# Patient Record
Sex: MALE | Race: WHITE | NOT HISPANIC OR LATINO | Employment: FULL TIME | URBAN - METROPOLITAN AREA
[De-identification: names, ages, dates, MRNs, and addresses within clinical notes are randomized per-mention and may not be internally consistent; named-entity substitution may affect disease eponyms.]

---

## 2019-01-03 ENCOUNTER — OFFICE VISIT - HEALTHEAST (OUTPATIENT)
Dept: FAMILY MEDICINE | Facility: CLINIC | Age: 54
End: 2019-01-03

## 2019-01-03 DIAGNOSIS — Z01.818 PREOP GENERAL PHYSICAL EXAM: ICD-10-CM

## 2019-01-03 DIAGNOSIS — G89.29 CHRONIC LEFT SHOULDER PAIN: ICD-10-CM

## 2019-01-03 DIAGNOSIS — M25.512 CHRONIC LEFT SHOULDER PAIN: ICD-10-CM

## 2019-01-03 LAB — HGB BLD-MCNC: 15.6 G/DL (ref 14–18)

## 2019-01-03 ASSESSMENT — MIFFLIN-ST. JEOR: SCORE: 1707.49

## 2019-04-10 ENCOUNTER — AMBULATORY - HEALTHEAST (OUTPATIENT)
Dept: FAMILY MEDICINE | Facility: CLINIC | Age: 54
End: 2019-04-10

## 2019-04-10 DIAGNOSIS — Z12.11 COLON CANCER SCREENING: ICD-10-CM

## 2019-05-21 ENCOUNTER — RECORDS - HEALTHEAST (OUTPATIENT)
Dept: ADMINISTRATIVE | Facility: OTHER | Age: 54
End: 2019-05-21

## 2019-05-22 ENCOUNTER — RECORDS - HEALTHEAST (OUTPATIENT)
Dept: ADMINISTRATIVE | Facility: OTHER | Age: 54
End: 2019-05-22

## 2019-06-12 ENCOUNTER — OFFICE VISIT - HEALTHEAST (OUTPATIENT)
Dept: FAMILY MEDICINE | Facility: CLINIC | Age: 54
End: 2019-06-12

## 2019-06-12 ENCOUNTER — RECORDS - HEALTHEAST (OUTPATIENT)
Dept: ADMINISTRATIVE | Facility: OTHER | Age: 54
End: 2019-06-12

## 2019-06-12 DIAGNOSIS — Z01.818 PREOP GENERAL PHYSICAL EXAM: ICD-10-CM

## 2019-06-12 DIAGNOSIS — G89.29 CHRONIC LEFT SHOULDER PAIN: ICD-10-CM

## 2019-06-12 DIAGNOSIS — M25.512 CHRONIC LEFT SHOULDER PAIN: ICD-10-CM

## 2019-06-12 LAB — HGB BLD-MCNC: 15.1 G/DL (ref 14–18)

## 2019-06-12 ASSESSMENT — MIFFLIN-ST. JEOR: SCORE: 1700.36

## 2019-06-24 ENCOUNTER — RECORDS - HEALTHEAST (OUTPATIENT)
Dept: ADMINISTRATIVE | Facility: OTHER | Age: 54
End: 2019-06-24

## 2020-09-26 ENCOUNTER — RECORDS - HEALTHEAST (OUTPATIENT)
Dept: ADMINISTRATIVE | Facility: OTHER | Age: 55
End: 2020-09-26

## 2021-05-29 NOTE — PROGRESS NOTES
Preoperative Exam    Scheduled Procedure: left shoulder surgery  Surgery Date:  6/24/19   Surgery Location: [facility], fax [phone]    Surgeon:  Dr. Rosen    Assessment/Plan:     Preop general physical exam, Chronic left shoulder pain  -     Hemoglobin    Surgical Procedure Risk: Low (reported cardiac risk generally < 1%)  Have you had prior anesthesia?: Yes  Have you or any family members had a previous anesthesia reaction:  No  Do you or any family members have a history of a clotting or bleeding disorder?: No  Cardiac Risk Assessment: no increased risk for major cardiac complications    Patient approved for surgery with general or local anesthesia.    Functional Status: Independent  Patient plans to recover at home with family.     Subjective:      Francisco Magana is a 54 y.o. male who presents for a preoperative consultation.  Chronic left shoulder pain from years of wear and tear.  No specific injury.  He has been following up with orthopedic.  He has limitation in range of motion because of the pain.  He initially has surgery scheduled for January 7, 2019 due to insurance difficulty that date was postponed for June 24, 2019    All other systems reviewed and are negative, other than those listed in the HPI.    Pertinent History  Do you have difficulty breathing or chest pain after walking up a flight of stairs: No  History of obstructive sleep apnea: No  Steroid use in the last 6 months: No  Frequent Aspirin/NSAID use: No  Prior Blood Transfusion: No  Prior Blood Transfusion Reaction: No  If for some reason prior to, during or after the procedure, if it is medically indicated, would you be willing to have a blood transfusion?:  There is no transfusion refusal.    No current outpatient medications on file.     No current facility-administered medications for this visit.         Allergies   Allergen Reactions     Adhesive Tape-Silicones Hives       Patient Active  Problem List   Diagnosis     Anxiety     Mood Disorder Of Unknown ( Axis III ) Etiology      Status post right hip replacement     Colon polyp       Past Medical History:   Diagnosis Date     Arthritis        Past Surgical History:   Procedure Laterality Date     BACK SURGERY       TOTAL HIP ARTHROPLASTY Right 2/24/2015    Procedure: HIP TOTAL ARTHROPLASTY RIGHT;  Surgeon: Escobar Jarrell MD;  Location: [facility] Main OR;  Service:        Social History     Socioeconomic History     Marital status: Single     Spouse name: Not on file     Number of children: Not on file     Years of education: Not on file     Highest education level: Not on file   Occupational History     Not on file   Social Needs     Financial resource strain: Not on file     Food insecurity:     Worry: Not on file     Inability: Not on file     Transportation needs:     Medical: Not on file     Non-medical: Not on file   Tobacco Use     Smoking status: Never Smoker     Smokeless tobacco: Never Used   Substance and Sexual Activity     Alcohol use: Yes     Alcohol/week: 1.0 oz     Types: 2 Standard drinks or equivalent per week     Comment: Occasionally.     Drug use: Not on file     Sexual activity: Yes     Partners: Female   Lifestyle     Physical activity:     Days per week: Not on file     Minutes per session: Not on file     Stress: Not on file   Relationships     Social connections:     Talks on phone: Not on file     Gets together: Not on file     Attends [employer] service: Not on file     Active member of club or organization: Not on file     Attends meetings of clubs or organizations: Not on file     Relationship status: Not on file     Intimate partner violence:     Fear of current or ex partner: Not on file     Emotionally abused: Not on file     Physically abused: Not on file     Forced sexual activity: Not on file   Other Topics Concern     Not on file   Social History Narrative     Not on file       Patient Care Team:  Virginia Shrestha  "Sita Waller MD as PCP - General          Objective:     Vitals:    06/12/19 1253   BP: 100/64   Pulse: 63   SpO2: 96%   Weight: 187 lb (84.8 kg)   Height: 5' 11\" (1.803 m)         Physical Exam:    Objective:    Physical Exam   Vitals:    06/12/19 1253   BP: 100/64   Pulse: 63   SpO2: 96%      Constitutional: Patient is oriented to person, place, and time. Patient appears well-developed and well-nourished. No distress.   Head: Normocephalic and atraumatic.   Right Ear: External ear normal. Normal TM  Left Ear: External ear normal. Normal TM  Nose: Nose normal.   Mouth/Throat: Oropharynx is clear and moist. No oropharyngeal exudate.   Eyes: Conjunctivae and EOM are normal. Pupils are equal, round, and reactive to light. Right eye exhibits no discharge. Left eye exhibits no discharge. No scleral icterus.   Neck: Neck supple. No JVD present. No tracheal deviation present. No thyromegaly present.   Cardiovascular: Normal rate, regular rhythm, normal heart sounds and intact distal pulses. No murmur heard.   Pulmonary/Chest: Effort normal and breath sounds normal. No stridor. No respiratory distress. Patient has no wheezes, no rales, exhibits no tenderness.   Skin: Skin is warm and dry. No rash noted. Patient is not diaphoretic. No erythema. No pallor.    Results for orders placed or performed in visit on 06/12/19   Hemoglobin   Result Value Ref Range    Hemoglobin 15.1 14.0 - 18.0 g/dL        Immunization History   Administered Date(s) Administered     Influenza, seasonal,quad inj 36+ mos 12/18/2015, 10/18/2016     Influenza, seasonal,quad inj 6-35 mos 11/05/2014     Tdap 05/09/2007         Electronically signed by Sita Shrestha MD 06/12/19 12:50 PM  "

## 2021-06-02 VITALS — BODY MASS INDEX: 26.28 KG/M2 | WEIGHT: 187.7 LBS | HEIGHT: 71 IN

## 2021-06-03 VITALS — HEIGHT: 71 IN | BODY MASS INDEX: 26.18 KG/M2 | WEIGHT: 187 LBS

## 2021-06-22 NOTE — PROGRESS NOTES
Preoperative Exam    Scheduled Procedure:  Left Shoulder Ligament Repair   Surgery Date:  1/7/19  Surgery Location: [facility], fax [phone]    Surgeon:  Dr. Bhaskar Mendes     Assessment/Plan:     Preop general physical exam, Chronic left shoulder pain  -     Hemoglobin    Surgical Procedure Risk: Low (reported cardiac risk generally < 1%)  Have you had prior anesthesia?: Yes  Have you or any family members had a previous anesthesia reaction:  No  Do you or any family members have a history of a clotting or bleeding disorder?: No  Cardiac Risk Assessment: no increased risk for major cardiac complications    Patient approved for surgery with general or local anesthesia.    Functional Status: Independent  Patient plans to recover at home with family.     Subjective:      Francisco Magana is a 53 y.o. male who presents for a preoperative consultation.  Chronic left shoulder pain from years of wear and tear.  No specific injury.  He has been following up with orthopedic.  He has limitation in range of motion because of the pain.    All other systems reviewed and are negative, other than those listed in the HPI.    Pertinent History  Surgical history of bilateral hip replacement and back surgery  Do you have difficulty breathing or chest pain after walking up a flight of stairs: No  History of obstructive sleep apnea: No  Steroid use in the last 6 months: No  Frequent Aspirin/NSAID use: No  Prior Blood Transfusion: No  Prior Blood Transfusion Reaction: No  If for some reason prior to, during or after the procedure, if it is medically indicated, would you be willing to have a blood transfusion?:  There is no transfusion refusal.    No current outpatient medications on file.     No current facility-administered medications for this visit.         Allergies   Allergen Reactions     Adhesive Tape-Silicones Hives       Patient Active Problem List   Diagnosis     Anxiety     Mood  "Disorder Of Unknown ( Axis III ) Etiology      Status post right hip replacement     Colon polyp       Past Medical History:   Diagnosis Date     Arthritis        Past Surgical History:   Procedure Laterality Date     BACK SURGERY       TOTAL HIP ARTHROPLASTY Right 2/24/2015    Procedure: HIP TOTAL ARTHROPLASTY RIGHT;  Surgeon: Escobar Jarrell MD;  Location: [facility] Main OR;  Service:        Social History     Socioeconomic History     Marital status: Single     Spouse name: Not on file     Number of children: Not on file     Years of education: Not on file     Highest education level: Not on file   Social Needs     Financial resource strain: Not on file     Food insecurity - worry: Not on file     Food insecurity - inability: Not on file     Transportation needs - medical: Not on file     Transportation needs - non-medical: Not on file   Occupational History     Not on file   Tobacco Use     Smoking status: Never Smoker     Smokeless tobacco: Never Used   Substance and Sexual Activity     Alcohol use: Yes     Alcohol/week: 1.0 oz     Types: 2 Standard drinks or equivalent per week     Comment: Occasionally.     Drug use: Not on file     Sexual activity: Yes     Partners: Female   Other Topics Concern     Not on file   Social History Narrative     Not on file       Patient Care Team:  Sita Pelletier MD as PCP - General          Objective:     Vitals:    01/03/19 0742   BP: 100/68   Pulse: 68   Temp: 97.8  F (36.6  C)   TempSrc: Oral   SpO2: 99%   Weight: 187 lb 11.2 oz (85.1 kg)   Height: 5' 11.25\" (1.81 m)         Physical Exam:    Objective:    Physical Exam   Vitals:    01/03/19 0742   BP: 100/68   Pulse: 68   Temp: 97.8  F (36.6  C)   SpO2: 99%      Constitutional: Patient is oriented to person, place, and time. Patient appears well-developed and well-nourished. No distress.   Head: Normocephalic and atraumatic.   Right Ear: External ear normal. Normal TM  Left Ear: External ear normal. Normal " TM  Nose: Nose normal.   Mouth/Throat: Oropharynx is clear and moist. No oropharyngeal exudate.   Eyes: Conjunctivae and EOM are normal. Pupils are equal, round, and reactive to light. Right eye exhibits no discharge. Left eye exhibits no discharge. No scleral icterus.   Neck: Neck supple. No JVD present. No tracheal deviation present. No thyromegaly present.   Cardiovascular: Normal rate, regular rhythm, normal heart sounds and intact distal pulses. No murmur heard.   Pulmonary/Chest: Effort normal and breath sounds normal. No stridor. No respiratory distress. Patient has no wheezes, no rales, exhibits no tenderness.   Abdominal: Soft. Bowel sounds are normal. Patient exhibits no distension and no mass. There is no tenderness. There is no rebound and no guarding.   Lymphadenopathy:  Patient has no cervical adenopathy.   Neurological: Patient is alert and oriented to person, place, and time. Patient has normal reflexes. No cranial nerve deficit. Coordination normal.   Skin: Skin is warm and dry. No rash noted. Patient is not diaphoretic. No erythema. No pallor.     Results for orders placed or performed in visit on 01/03/19   Hemoglobin   Result Value Ref Range    Hemoglobin 15.6 14.0 - 18.0 g/dL         Immunization History   Administered Date(s) Administered     Influenza, seasonal,quad inj 36+ mos 12/18/2015, 10/18/2016     Influenza, seasonal,quad inj 6-35 mos 11/05/2014     Tdap 05/09/2007           Electronically signed by Sita Shrestha MD 01/03/19 7:38 AM

## 2021-08-21 ENCOUNTER — HEALTH MAINTENANCE LETTER (OUTPATIENT)
Age: 56
End: 2021-08-21

## 2021-10-16 ENCOUNTER — HEALTH MAINTENANCE LETTER (OUTPATIENT)
Age: 56
End: 2021-10-16

## 2022-09-25 ENCOUNTER — HEALTH MAINTENANCE LETTER (OUTPATIENT)
Age: 57
End: 2022-09-25

## 2023-10-14 ENCOUNTER — HEALTH MAINTENANCE LETTER (OUTPATIENT)
Age: 58
End: 2023-10-14

## 2023-11-26 ASSESSMENT — ENCOUNTER SYMPTOMS
SORE THROAT: 0
SHORTNESS OF BREATH: 0
CONSTIPATION: 0
HEARTBURN: 0
ARTHRALGIAS: 1
PARESTHESIAS: 0
JOINT SWELLING: 0
CHILLS: 0
NAUSEA: 0
DIARRHEA: 0
NERVOUS/ANXIOUS: 0
FREQUENCY: 0
HEADACHES: 0
EYE PAIN: 0
WEAKNESS: 0
HEMATURIA: 0
COUGH: 0
PALPITATIONS: 0
HEMATOCHEZIA: 0
FEVER: 0
DYSURIA: 0
DIZZINESS: 0
ABDOMINAL PAIN: 0
MYALGIAS: 0

## 2023-11-26 NOTE — COMMUNITY RESOURCES LIST (ENGLISH)
11/26/2023   [facility]  N/A  For questions about this resource list or additional care needs, please contact your primary care clinic or care manager.  Phone: [phone]   Email: N/A   Address: [address] [place] [id number]   Hours: N/A        Financial Stability       Utility payment assistance  1  The [facility]  Office [facility] Distance: 3.88 miles      In-Person, Phone/Virtual   [address] [place] [address]  Language: English  Hours: Mon - Fri 8:00 AM - 12:00 PM , Mon - Fri 1:00 PM - 4:00 PM  Fees: Free   Phone: (381) 823-2037 Email: [email or web address] Website: [email or web address].[email or web address]/[facility]/social-services-office-[email or web address]/     2  [place] [employer] [facility] [place]'s Telephone Assistance Plan (TAP) and Federal Lifeline and Affordable Connectivity Program (ACP) Distance: 6.41 miles      Phone/Virtual   [address] [place] [address]  Language: English  Fees: Free   Phone: (860) 221-4845 Email: consumer.maritza@[facility].[place]. Website: [place]://[place]puc/consumers/telephone/          Important Numbers & Websites       Emergency Services   911  City Services   311  Poison Control   (805) 220-4216  Suicide Prevention Lifeline   (259) 394-1799 (TALK)  Child Abuse Hotline   (250) 931-8692 (4-A-Child)  Sexual Assault Hotline   (259) 337-4797 (HOPE)  National Runaway Safeline   (723) 662-1460 (RUNAWAY)  All-Options Talkline   (401) 928-2426  Substance Abuse Referral   (980) 244-2215 (HELP)

## 2023-11-27 ENCOUNTER — OFFICE VISIT (OUTPATIENT)
Dept: FAMILY MEDICINE | Facility: CLINIC | Age: 58
End: 2023-11-27
Payer: COMMERCIAL

## 2023-11-27 VITALS
HEIGHT: 71 IN | TEMPERATURE: 98.4 F | HEART RATE: 71 BPM | SYSTOLIC BLOOD PRESSURE: 115 MMHG | RESPIRATION RATE: 12 BRPM | BODY MASS INDEX: 28 KG/M2 | OXYGEN SATURATION: 97 % | WEIGHT: 200 LBS | DIASTOLIC BLOOD PRESSURE: 73 MMHG

## 2023-11-27 DIAGNOSIS — Z00.00 VISIT FOR PREVENTIVE HEALTH EXAMINATION: Primary | ICD-10-CM

## 2023-11-27 LAB
ALT SERPL W P-5'-P-CCNC: 20 U/L (ref 0–70)
AST SERPL W P-5'-P-CCNC: 20 U/L (ref 0–45)
CHOLEST SERPL-MCNC: 232 MG/DL
ERYTHROCYTE [DISTWIDTH] IN BLOOD BY AUTOMATED COUNT: 12.5 % (ref 10–15)
HBA1C MFR BLD: 5.3 % (ref 0–5.6)
HCT VFR BLD AUTO: 45.4 % (ref 40–53)
HDLC SERPL-MCNC: 60 MG/DL
HGB BLD-MCNC: 15.3 G/DL (ref 13.3–17.7)
LDLC SERPL CALC-MCNC: 145 MG/DL
MCH RBC QN AUTO: 30.4 PG (ref 26.5–33)
MCHC RBC AUTO-ENTMCNC: 33.7 G/DL (ref 31.5–36.5)
MCV RBC AUTO: 90 FL (ref 78–100)
NONHDLC SERPL-MCNC: 172 MG/DL
PLATELET # BLD AUTO: 293 10E3/UL (ref 150–450)
PSA SERPL DL<=0.01 NG/ML-MCNC: 0.69 NG/ML (ref 0–3.5)
RBC # BLD AUTO: 5.03 10E6/UL (ref 4.4–5.9)
TRIGL SERPL-MCNC: 134 MG/DL
WBC # BLD AUTO: 5.7 10E3/UL (ref 4–11)

## 2023-11-27 PROCEDURE — 36415 COLL VENOUS BLD VENIPUNCTURE: CPT | Performed by: NURSE PRACTITIONER

## 2023-11-27 PROCEDURE — 90472 IMMUNIZATION ADMIN EACH ADD: CPT | Performed by: NURSE PRACTITIONER

## 2023-11-27 PROCEDURE — 85027 COMPLETE CBC AUTOMATED: CPT | Performed by: NURSE PRACTITIONER

## 2023-11-27 PROCEDURE — 84460 ALANINE AMINO (ALT) (SGPT): CPT | Performed by: NURSE PRACTITIONER

## 2023-11-27 PROCEDURE — 80061 LIPID PANEL: CPT | Performed by: NURSE PRACTITIONER

## 2023-11-27 PROCEDURE — 83036 HEMOGLOBIN GLYCOSYLATED A1C: CPT | Performed by: NURSE PRACTITIONER

## 2023-11-27 PROCEDURE — 84450 TRANSFERASE (AST) (SGOT): CPT | Performed by: NURSE PRACTITIONER

## 2023-11-27 PROCEDURE — 90750 HZV VACC RECOMBINANT IM: CPT | Performed by: NURSE PRACTITIONER

## 2023-11-27 PROCEDURE — 90471 IMMUNIZATION ADMIN: CPT | Performed by: NURSE PRACTITIONER

## 2023-11-27 PROCEDURE — G0103 PSA SCREENING: HCPCS | Performed by: NURSE PRACTITIONER

## 2023-11-27 PROCEDURE — 99386 PREV VISIT NEW AGE 40-64: CPT | Mod: 25 | Performed by: NURSE PRACTITIONER

## 2023-11-27 PROCEDURE — 90715 TDAP VACCINE 7 YRS/> IM: CPT | Performed by: NURSE PRACTITIONER

## 2023-11-27 ASSESSMENT — ENCOUNTER SYMPTOMS
PALPITATIONS: 0
SHORTNESS OF BREATH: 0
ABDOMINAL PAIN: 0
DIARRHEA: 0
FREQUENCY: 0
NAUSEA: 0
NERVOUS/ANXIOUS: 0
HEMATOCHEZIA: 0
ARTHRALGIAS: 1
CONSTIPATION: 0
FEVER: 0
PARESTHESIAS: 0
CHILLS: 0
HEMATURIA: 0
DIZZINESS: 0
JOINT SWELLING: 0
MYALGIAS: 0
EYE PAIN: 0
WEAKNESS: 0
COUGH: 0
DYSURIA: 0
HEADACHES: 0
HEARTBURN: 0
SORE THROAT: 0

## 2023-11-27 NOTE — COMMUNITY RESOURCES LIST (ENGLISH)
11/27/2023   [facility]  N/A  For questions about this resource list or additional care needs, please contact your primary care clinic or care manager.  Phone: [phone]   Email: N/A   Address: [address] [place] [address]   Hours: N/A        Financial Stability       Utility payment assistance  1  The [facility]  Office [facility] Distance: 3.88 miles      In-Person, Phone/Virtual   [address] [place] [address]  Language: English  Hours: Mon - Fri 8:00 AM - 12:00 PM , Mon - Fri 1:00 PM - 4:00 PM  Fees: Free   Phone: (541) 632-1843 Email: [email or web address] Website: [email or web address]/social-services-office-[email or web address]/     2  [place] [employer] [facility] - [place]'s Telephone Assistance Plan (TAP) and Federal Lifeline and Affordable Connectivity Program (ACP) Distance: 6.41 miles      Phone/Virtual   [address] [place] [address]  Language: English  Fees: Free   Phone: (562) 627-8140 Email: consumer.maritza@[facility].[place]. Website: https://[place]/puc/consumers/telephone/          Important Numbers & Websites       Emergency Services   911  City Services   311  Poison Control   (291) 947-5163  Suicide Prevention Lifeline   (285) 100-8130 (TALK)  Child Abuse Hotline   (860) 409-6434 (4-A-Child)  Sexual Assault Hotline   (244) 442-4427 (HOPE)  National Runaway Safeline   (224) 608-4607 (RUNAWAY)  All-Options Talkline   (995) 932-6671  Substance Abuse Referral   (217) 637-3671 (HELP)

## 2023-11-27 NOTE — PROGRESS NOTES
SUBJECTIVE:   Kolton is a 58 year old, presenting for the following:  Physical  - feels overall well, mental health is good. No concerns today.       11/27/2023     8:29 AM   Additional Questions   Roomed by Demetrio       Healthy Habits:     Getting at least 3 servings of Calcium per day:  Yes    Bi-annual eye exam:  NO    Dental care twice a year:  NO    Sleep apnea or symptoms of sleep apnea:  None    Diet:  Regular (no restrictions)    Frequency of exercise:  2-3 days/week    Duration of exercise:  45-60 minutes    Taking medications regularly:  Not Applicable    Medication side effects:  None    Additional concerns today:  No      Today's PHQ-2 Score:       11/26/2023     1:30 PM   PHQ-2 ( 1999 [employer])   Q1: Little interest or pleasure in doing things 0   Q2: Feeling down, depressed or hopeless 0   PHQ-2 Score 0   Q1: Little interest or pleasure in doing things Not at all   Q2: Feeling down, depressed or hopeless Not at all   PHQ-2 Score 0                 Chief Complaint   Patient presents with    Physical      Have you ever done Advance Care Planning? (For example, a Health Directive, POLST, or a discussion with a medical provider or your loved ones about your wishes): No, advance care planning information given to patient to review.  Patient plans to discuss their wishes with loved ones or provider.      Social History     Tobacco Use    Smoking status: Never    Smokeless tobacco: Never   Substance Use Topics    Alcohol use: Yes     Alcohol/week: 1.7 standard drinks of alcohol     Comment: Alcoholic Drinks/day: Occasionally.             11/26/2023     1:27 PM   Alcohol Use   Prescreen: >3 drinks/day or >7 drinks/week? No          No data to display                Last PSA:   Prostate Specific Antigen Screen   Date Value Ref Range Status   11/27/2023 0.69 0.00 - 3.50 ng/mL Final       Reviewed orders with patient. Reviewed health maintenance and updated orders accordingly - Yes  Lab work is in process  Labs reviewed  "in EPIC    Reviewed and updated as needed this visit by clinical staff     Meds              Reviewed and updated as needed this visit by Provider                 History reviewed. No pertinent past medical history.   Past Surgical History:   Procedure Laterality Date    BACK SURGERY      TOTAL HIP ARTHROPLASTY Right 2/24/2015    Procedure: HIP TOTAL ARTHROPLASTY RIGHT;  Surgeon: Escobar Jarrell MD;  Location: [facility];  Service:        Review of Systems   Constitutional:  Negative for chills and fever.   HENT:  Negative for congestion, ear pain, hearing loss and sore throat.    Eyes:  Negative for pain and visual disturbance.   Respiratory:  Negative for cough and shortness of breath.    Cardiovascular:  Negative for chest pain, palpitations and peripheral edema.   Gastrointestinal:  Negative for abdominal pain, constipation, diarrhea, heartburn, hematochezia and nausea.   Genitourinary:  Negative for dysuria, frequency, genital sores, hematuria, impotence, penile discharge and urgency.   Musculoskeletal:  Positive for arthralgias. Negative for joint swelling and myalgias.   Skin:  Negative for rash.   Neurological:  Negative for dizziness, weakness, headaches and paresthesias.   Psychiatric/Behavioral:  Negative for mood changes. The patient is not nervous/anxious.          OBJECTIVE:   /73   Pulse 71   Temp 98.4  F (36.9  C) (Oral)   Resp 12   Ht 1.803 m (5' 11\")   Wt 90.7 kg (200 lb)   SpO2 97%   BMI 27.89 kg/m      Physical Exam  GENERAL: healthy, alert and no distress  EYES: Eyes grossly normal to inspection, PERRL and conjunctivae and sclerae normal  NECK: no adenopathy, no asymmetry, masses, or scars and thyroid normal to palpation  RESP: lungs clear to auscultation - no rales, rhonchi or wheezes  CV: regular rate and rhythm, normal S1 S2, no S3 or S4, no murmur, click or rub, no peripheral edema and peripheral pulses strong  ABDOMEN: soft, nontender, no hepatosplenomegaly, no masses " and bowel sounds normal  MS: no gross musculoskeletal defects noted, no edema  SKIN: no suspicious lesions or rashes  NEURO: Normal strength and tone, mentation intact and speech normal  PSYCH: mentation appears normal, affect normal/bright    Diagnostic Test Results:  Labs reviewed in Epic    ASSESSMENT/PLAN:       ICD-10-CM    1. Visit for preventive health examination  Z00.00 Lipid Profile (Chol, Trig, HDL, LDL calc)     PSA, screen     AST     ALT     CBC with platelets     Hemoglobin A1c     Lipid Profile (Chol, Trig, HDL, LDL calc)     PSA, screen     AST     ALT     CBC with platelets     Hemoglobin A1c        - labs appear stable. Your 10-year ASCVD score is low, so no statin medication is needed at this time. Please continue to eat a low saturated fat diet and get regular exercise.     The 10-year ASCVD risk score (Amelia WAN, et al., 2019) is: 6.1%    Values used to calculate the score:      Age: 58 years      Sex: Male      Is Non- : No      Diabetic: No      Tobacco smoker: No      Systolic Blood Pressure: 115 mmHg      Is BP treated: No      HDL Cholesterol: 60 mg/dL      Total Cholesterol: 232 mg/dL\    Patient has been advised of split billing requirements and indicates understanding: Yes      COUNSELING:   Reviewed preventive health counseling, as reflected in patient instructions        He reports that he has never smoked. He has never used smokeless tobacco.            VIVEK Kendall [facility]

## 2023-11-29 NOTE — RESULT ENCOUNTER NOTE
Hi!    It was a pleasure meeting you!    Your labs appear stable. Your 10-year ASCVD score is low, so no statin medication is needed at this time. Please continue to eat a low saturated fat diet and get regular exercise.     Happy Holidays!    Loan

## 2024-07-07 ENCOUNTER — TRANSFERRED RECORDS (OUTPATIENT)
Dept: HEALTH INFORMATION MANAGEMENT | Facility: CLINIC | Age: 59
End: 2024-07-07

## 2024-07-24 ENCOUNTER — OFFICE VISIT (OUTPATIENT)
Dept: INTERNAL MEDICINE | Facility: CLINIC | Age: 59
End: 2024-07-24
Payer: COMMERCIAL

## 2024-07-24 VITALS
DIASTOLIC BLOOD PRESSURE: 78 MMHG | HEART RATE: 78 BPM | TEMPERATURE: 97.9 F | OXYGEN SATURATION: 95 % | BODY MASS INDEX: 28.42 KG/M2 | RESPIRATION RATE: 14 BRPM | SYSTOLIC BLOOD PRESSURE: 110 MMHG | WEIGHT: 203 LBS | HEIGHT: 71 IN

## 2024-07-24 DIAGNOSIS — R68.84 MAXILLARY PAIN: ICD-10-CM

## 2024-07-24 DIAGNOSIS — R68.84 JAW PAIN: ICD-10-CM

## 2024-07-24 DIAGNOSIS — S06.0X0A CONCUSSION WITHOUT LOSS OF CONSCIOUSNESS, INITIAL ENCOUNTER: ICD-10-CM
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
## 2024-07-24 PROCEDURE — 99214 OFFICE O/P EST MOD 30 MIN: CPT | Performed by: NURSE PRACTITIONER

## 2024-07-24 PROCEDURE — G2211 COMPLEX E/M VISIT ADD ON: HCPCS | Performed by: NURSE PRACTITIONER

## 2024-07-24 RX ORDER — IBUPROFEN 200 MG
400 TABLET ORAL DAILY
COMMUNITY

## 2024-07-24 ASSESSMENT — PATIENT HEALTH QUESTIONNAIRE - PHQ9
10. IF YOU CHECKED OFF ANY PROBLEMS, HOW DIFFICULT HAVE THESE PROBLEMS MADE IT FOR YOU TO DO YOUR WORK, TAKE CARE OF THINGS AT HOME, OR GET ALONG WITH OTHER PEOPLE: VERY DIFFICULT
SUM OF ALL RESPONSES TO PHQ QUESTIONS 1-9: 17
SUM OF ALL RESPONSES TO PHQ QUESTIONS 1-9: 17
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
## 2024-07-28 ENCOUNTER — TRANSFERRED RECORDS (OUTPATIENT)
Dept: HEALTH INFORMATION MANAGEMENT | Facility: CLINIC | Age: 59
End: 2024-07-28
Payer: COMMERCIAL

## 2024-08-07 ENCOUNTER — OFFICE VISIT (OUTPATIENT)
Dept: PHYSICAL MEDICINE AND REHAB | Facility: CLINIC | Age: 59
End: 2024-08-07
Attending: NURSE PRACTITIONER
Payer: COMMERCIAL

## 2024-08-07 VITALS — DIASTOLIC BLOOD PRESSURE: 67 MMHG | HEART RATE: 70 BPM | SYSTOLIC BLOOD PRESSURE: 106 MMHG
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
## 2024-08-07 DIAGNOSIS — S06.0X0A CONCUSSION WITHOUT LOSS OF CONSCIOUSNESS, INITIAL ENCOUNTER: ICD-10-CM

## 2024-08-07 DIAGNOSIS — S06.0XAA CONCUSSION WITH UNKNOWN LOSS OF CONSCIOUSNESS STATUS, INITIAL ENCOUNTER: Primary | ICD-10-CM

## 2024-08-07 PROCEDURE — 99205 OFFICE O/P NEW HI 60 MIN: CPT | Performed by: PHYSICAL MEDICINE & REHABILITATION
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
## 2024-08-08 ENCOUNTER — TRANSFERRED RECORDS (OUTPATIENT)
Dept: HEALTH INFORMATION MANAGEMENT | Facility: CLINIC | Age: 59
End: 2024-08-08
Payer: COMMERCIAL

## 2024-08-21 ENCOUNTER — HOSPITAL ENCOUNTER (OUTPATIENT)
Dept: CT IMAGING | Facility: HOSPITAL | Age: 59
Discharge: HOME | End: 2024-08-21
Attending: NURSE PRACTITIONER | Admitting: NURSE PRACTITIONER
Payer: COMMERCIAL

## 2024-08-21 DIAGNOSIS — R68.84 JAW PAIN: ICD-10-CM

## 2024-08-21 DIAGNOSIS — R68.84 MAXILLARY PAIN: ICD-10-CM
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
## 2024-08-21 DIAGNOSIS — S06.0XAA CONCUSSION WITH UNKNOWN LOSS OF CONSCIOUSNESS STATUS, INITIAL ENCOUNTER: ICD-10-CM

## 2024-08-21 PROCEDURE — 70450 CT HEAD/BRAIN W/O DYE: CPT

## 2024-08-21 PROCEDURE — 70486 CT MAXILLOFACIAL W/O DYE: CPT

## 2024-08-25 ENCOUNTER — MYC MEDICAL ADVICE (OUTPATIENT)
Dept: PHYSICAL MEDICINE AND REHAB | Facility: CLINIC | Age: 59
End: 2024-08-25
Payer: COMMERCIAL

## 2024-09-11 ENCOUNTER — OFFICE VISIT (OUTPATIENT)
Dept: PHYSICAL MEDICINE AND REHAB | Facility: CLINIC | Age: 59
End: 2024-09-11
Payer: COMMERCIAL

## 2024-09-11 VITALS — HEART RATE: 61 BPM | SYSTOLIC BLOOD PRESSURE: 107 MMHG | DIASTOLIC BLOOD PRESSURE: 78 MMHG

## 2024-09-11 DIAGNOSIS — F39 MOOD DISORDER (H): Primary | ICD-10-CM

## 2024-09-11 DIAGNOSIS — S06.0XAD CONCUSSION WITH UNKNOWN LOSS OF CONSCIOUSNESS STATUS, SUBSEQUENT ENCOUNTER: ICD-10-CM

## 2024-09-11 PROCEDURE — 99214 OFFICE O/P EST MOD 30 MIN: CPT | Performed by: PHYSICAL MEDICINE & REHABILITATION

## 2024-09-11 RX ORDER — AMITRIPTYLINE HYDROCHLORIDE 10 MG/1
10 TABLET ORAL AT BEDTIME
Qty: 30 TABLET | Refills: 2 | Status: SHIPPED | OUTPATIENT
Start: 2024-09-11 | End: 2024-12-10
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
## 2024-10-10 ENCOUNTER — THERAPY VISIT (OUTPATIENT)
Dept: SPEECH THERAPY | Facility: REHABILITATION | Age: 59
End: 2024-10-10
Attending: PHYSICAL MEDICINE & REHABILITATION
Payer: COMMERCIAL

## 2024-10-10 DIAGNOSIS — S06.0XAD CONCUSSION WITH UNKNOWN LOSS OF CONSCIOUSNESS STATUS, SUBSEQUENT ENCOUNTER: ICD-10-CM

## 2024-10-10 DIAGNOSIS — R41.841 COGNITIVE COMMUNICATION DEFICIT: Primary | ICD-10-CM

## 2024-10-10 PROCEDURE — 92523 SPEECH SOUND LANG COMPREHEN: CPT | Mod: GN | Performed by: SPEECH-LANGUAGE PATHOLOGIST
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
## 2024-10-24 ENCOUNTER — THERAPY VISIT (OUTPATIENT)
Dept: SPEECH THERAPY | Facility: REHABILITATION | Age: 59
End: 2024-10-24
Payer: COMMERCIAL

## 2024-10-24 DIAGNOSIS — R41.841 COGNITIVE COMMUNICATION DEFICIT: Primary | ICD-10-CM

## 2024-10-24 PROCEDURE — 92507 TX SP LANG VOICE COMM INDIV: CPT | Mod: GN | Performed by: SPEECH-LANGUAGE PATHOLOGIST

## 2024-10-28 ENCOUNTER — PATIENT OUTREACH (OUTPATIENT)
Dept: CARE COORDINATION | Facility: CLINIC | Age: 59
End: 2024-10-28
Payer: COMMERCIAL

## 2024-10-29 ENCOUNTER — OFFICE VISIT (OUTPATIENT)
Dept: NEUROLOGY | Facility: CLINIC | Age: 59
End: 2024-10-29
Payer: COMMERCIAL

## 2024-10-29 DIAGNOSIS — S06.0XAD CONCUSSION WITH UNKNOWN LOSS OF CONSCIOUSNESS STATUS, SUBSEQUENT ENCOUNTER: ICD-10-CM

## 2024-10-29 DIAGNOSIS — F43.23 ADJUSTMENT DISORDER WITH MIXED ANXIETY AND DEPRESSED MOOD: Primary | ICD-10-CM
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
## 2024-10-31 ENCOUNTER — THERAPY VISIT (OUTPATIENT)
Dept: SPEECH THERAPY | Facility: REHABILITATION | Age: 59
End: 2024-10-31
Payer: COMMERCIAL

## 2024-10-31 DIAGNOSIS — R41.841 COGNITIVE COMMUNICATION DEFICIT: Primary | ICD-10-CM

## 2024-10-31 PROCEDURE — 92507 TX SP LANG VOICE COMM INDIV: CPT | Mod: GN | Performed by: SPEECH-LANGUAGE PATHOLOGIST

## 2024-11-05 ENCOUNTER — OFFICE VISIT (OUTPATIENT)
Dept: PHYSICAL MEDICINE AND REHAB | Facility: CLINIC | Age: 59
End: 2024-11-05
Payer: COMMERCIAL

## 2024-11-05 VITALS — SYSTOLIC BLOOD PRESSURE: 104 MMHG | HEART RATE: 63 BPM | DIASTOLIC BLOOD PRESSURE: 65 MMHG

## 2024-11-05 DIAGNOSIS — S06.0XAD CONCUSSION WITH UNKNOWN LOSS OF CONSCIOUSNESS STATUS, SUBSEQUENT ENCOUNTER: Primary | ICD-10-CM
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
## 2024-11-05 NOTE — NURSING NOTE
Chief Complaint   Patient presents with    Follow Up     Return       Marcie Pennington MA on 11/5/2024 at 8:11 AM
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
## 2024-11-11 ENCOUNTER — PATIENT OUTREACH (OUTPATIENT)
Dept: CARE COORDINATION | Facility: CLINIC | Age: 59
End: 2024-11-11
Payer: COMMERCIAL

## 2024-11-14 ENCOUNTER — THERAPY VISIT (OUTPATIENT)
Dept: SPEECH THERAPY | Facility: REHABILITATION | Age: 59
End: 2024-11-14
Payer: COMMERCIAL

## 2024-11-14 DIAGNOSIS — R41.841 COGNITIVE COMMUNICATION DEFICIT: Primary | ICD-10-CM

## 2024-11-14 PROCEDURE — 92507 TX SP LANG VOICE COMM INDIV: CPT | Mod: GN | Performed by: SPEECH-LANGUAGE PATHOLOGIST

## 2024-12-10 ENCOUNTER — THERAPY VISIT (OUTPATIENT)
Dept: SPEECH THERAPY | Facility: REHABILITATION | Age: 59
End: 2024-12-10
Payer: COMMERCIAL

## 2024-12-10 DIAGNOSIS — R41.841 COGNITIVE COMMUNICATION DEFICIT: Primary | ICD-10-CM

## 2024-12-10 PROCEDURE — 92507 TX SP LANG VOICE COMM INDIV: CPT | Mod: GN | Performed by: SPEECH-LANGUAGE PATHOLOGIST

## 2024-12-12 ENCOUNTER — THERAPY VISIT (OUTPATIENT)
Dept: PHYSICAL THERAPY | Facility: REHABILITATION | Age: 59
End: 2024-12-12
Payer: COMMERCIAL

## 2024-12-12 DIAGNOSIS — M54.2 NECK PAIN: Primary | ICD-10-CM
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
## 2025-01-18 ENCOUNTER — HEALTH MAINTENANCE LETTER (OUTPATIENT)
Age: 60
End: 2025-01-18

## 2025-01-21 ENCOUNTER — THERAPY VISIT (OUTPATIENT)
Dept: SPEECH THERAPY | Facility: REHABILITATION | Age: 60
End: 2025-01-21
Payer: COMMERCIAL

## 2025-01-21 DIAGNOSIS — R41.841 COGNITIVE COMMUNICATION DEFICIT: Primary | ICD-10-CM

## 2025-01-21 PROCEDURE — 92507 TX SP LANG VOICE COMM INDIV: CPT | Mod: GN | Performed by: SPEECH-LANGUAGE PATHOLOGIST

## 2025-01-21 NOTE — PROGRESS NOTES
PLAN  Plan to extend POC for x90 days (1/9/25-4/9/25) for x3 sessions, monthly.    Beginning/End Dates of Progress Note Reporting Period:  10/10/24  to 01/21/2025    Referring Provider:  Kirstie Cunningham       01/21/25 0500   Appointment Info   Treating Provider Carol Johnson M.S. CCC-SLP   Total/Authorized Visits 7   Visits Used 5/7   Medical Diagnosis Concussion with unknown loss of consciousness status, subsequent encounter (S06.0XAD)   SLP Tx Diagnosis Cognitive Communication Deficit   Progress Note/Certification   Onset Of Illness/injury Or Date Of Surgery 07/07/24   Therapy Frequency monthly   Predicted Duration 90 days   Progress Note Completed Date 10/10/24   Subjective Report   Subjective Report Patient arrived to therapy in good spirits.  He expressed stress relating to running late from a flat tire.  He noted he has felt some mild improvement with headaches this past week- has had more focalized PT on that area as well as an injection which he felt helpful.   SLP Goals   SLP Goals 1;2;3;4   SLP Goal 1   Goal Identifier [place]   Goal Description Patient will report decreased cognitive-linguistic symptoms per [facility] Post Concussion Symptoms Questionnaire by end of POC.   Rationale To maximize functional communication within the home or community;To maximize safety and independence with cognitive function within the home or community   Goal Progress Goal not met- minimal change reported upon re-administration on 11/14/24.  Unable to readministered on today's date given shortened session.  Continue goal.   Target Date 04/09/24   SLP Goal 2   Goal Identifier Cognitive Linguistic Strategies   Goal Description Patient will learn and demonstrate use of x3 cognitive-linguistic compensatory strategies by end of POC.   Rationale To maximize the ability to communicate wants and needs within the home or community;To maximize safety and independence with cognitive function within the home or community   Goal  Progress Goal met   Target Date 01/08/25   Date Met 01/21/25   SLP Goal 3   Goal Identifier Fatigue Management   Goal Description Patient will demonstrate x2 instances of modifying day/week to support cognitive-linguistic function by end of POC.   Rationale To maximize the ability to communicate wants and needs within the home or community;To maximize safety and independence with cognitive function within the home or community   Goal Progress Goal initiated, not yet met.  Continue.   Target Date 04/09/24   SLP Goal 4   Goal Identifier 4. Functional Tasks   Goal Description Patient will identify x2 functional goals and implement Goal, Plan, Do, Review framework with min cues from SLP.   Rationale To maximize functional communication within the home or community;To maximize the ability to communicate wants and needs within the home or community;To maximize safety and independence with cognitive function within the home or community   Goal Progress Goal added 1/21/25   Target Date 04/09/25   Treatment Interventions (SLP)   Treatment Interventions Treatment Speech/Lang/Voice   Treatment Speech/Lang/Voice   Treatment of Speech, Language, Voice Communication&/or Auditory Processing (88003) 30 Minutes  (session shortened due to late arrival- patient got a flat tire)   Speech/Lang/Voice 1 - Details Home Practice: Patient reported ongoing benefit from strategies/tools previously trained.  He continues to use a notebook to write things down.  Has trialed some pre-planning- encouraged further generalization of this strategy with functional examples.  Education provided on prospective memory tasks and how to utilize external aids.  Patient attempted to text himself; however, this did not work and has not trialed again.  He has not yet gotten his [employer] calendar.  SLP engaged patient in functional discussion for strategy generalization/support.  Patient identified some tasks are frustrating (e.g., not being able to connect  speakers to new router).  SLP modeled how to use AI to generate step by step instructions/clarifications- patient will trial.  Patient noted he does still feel fatigued- reviewed concepts to taking a short brain break (e.g., going to office, ect) in those instances.   Skilled Intervention Provided written and verbal information on.;Modeled compensatory strategies;Provided feedback on performance of tasks   Patient Response/Progress Good reported benefit from strategy use.  Patient endorses benefit from therapy sessions to generalize strategies.   Education   Learner/Method Patient;Listening;Pictures/Video   Education Comments Internal/external memory strategies   Plan   Home program Internal/external memory strategies, minimize distractions, utilize small notebook to reduce internal distractions and aid alternating attention, increase exposures to information, write out list of daily vendors,   Updates to plan of care Plan to extend POC for x90 days (1/9/25-4/9/25) for x3 sessions, monthly.   Plan for next session f/u strategy use, functional practice, [place]   Total Session Time   Total Treatment Time (sum of timed and untimed services) 30

## 2025-01-28 ENCOUNTER — VIRTUAL VISIT (OUTPATIENT)
Dept: PHYSICAL MEDICINE AND REHAB | Facility: CLINIC | Age: 60
End: 2025-01-28
Payer: COMMERCIAL

## 2025-01-28 DIAGNOSIS — S06.0XAD CONCUSSION WITH UNKNOWN LOSS OF CONSCIOUSNESS STATUS, SUBSEQUENT ENCOUNTER: Primary | ICD-10-CM

## 2025-01-28 DIAGNOSIS — M54.2 NECK PAIN: ICD-10-CM

## 2025-01-28 PROCEDURE — 98007 SYNCH AUDIO-VIDEO EST HI 40: CPT | Performed by: PHYSICIAN ASSISTANT

## 2025-01-28 RX ORDER — LIDOCAINE 50 MG/G
1 PATCH TOPICAL EVERY 24 HOURS
Qty: 6 PATCH | Refills: 0 | Status: SHIPPED | OUTPATIENT
Start: 2025-01-28 | End: 2025-02-02

## 2025-01-28 ASSESSMENT — PAIN SCALES - GENERAL: PAINLEVEL_OUTOF10: MILD PAIN (3)
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
## 2025-01-28 NOTE — NURSING NOTE
Current patient location: [address] [place] [address] [address]  [facility] [address]    Is the patient currently in the state of [place]? YES    Visit mode: VIDEO    If the visit is dropped, the patient can be reconnected by:VIDEO VISIT: Send to e-mail at: [email or web address]    Will anyone else be joining the visit? NO  (If patient encounters technical issues they should call [phone]    Are changes needed to the allergy or medication list? No    Are refills needed on medications prescribed by this physician? NO    Rooming Documentation:  Questionnaire(s) completed    Reason for visit: Consult    Alejandra HILL
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
## 2025-01-30 ENCOUNTER — TELEPHONE (OUTPATIENT)
Dept: PHYSICAL MEDICINE AND REHAB | Facility: CLINIC | Age: 60
End: 2025-01-30
Payer: COMMERCIAL

## 2025-01-30 NOTE — TELEPHONE ENCOUNTER
Patient confirmed scheduled appointment:  Date: 4/29/25  Time: 845am  Visit type: Return Concussion  Provider: Irish Patterson  Location: Virtual  Testing/imaging: NA  Additional notes: 3 month follow up    Radha Bean on 1/30/2025 at 3:57 PM

## 2025-03-18 ENCOUNTER — THERAPY VISIT (OUTPATIENT)
Dept: SPEECH THERAPY | Facility: REHABILITATION | Age: 60
End: 2025-03-18
Payer: COMMERCIAL

## 2025-03-18 DIAGNOSIS — R41.841 COGNITIVE COMMUNICATION DEFICIT: Primary | ICD-10-CM

## 2025-03-18 PROCEDURE — 92507 TX SP LANG VOICE COMM INDIV: CPT | Mod: GN | Performed by: SPEECH-LANGUAGE PATHOLOGIST

## 2025-03-31 ENCOUNTER — PATIENT OUTREACH (OUTPATIENT)
Dept: CARE COORDINATION | Facility: CLINIC | Age: 60
End: 2025-03-31
Payer: COMMERCIAL

## 2025-05-26 ENCOUNTER — PATIENT OUTREACH (OUTPATIENT)
Dept: CARE COORDINATION | Facility: CLINIC | Age: 60
End: 2025-05-26
Payer: COMMERCIAL

## 2025-06-04 ENCOUNTER — OFFICE VISIT (OUTPATIENT)
Dept: URGENT CARE | Facility: URGENT CARE | Age: 60
End: 2025-06-04
Payer: COMMERCIAL

## 2025-06-04 VITALS
SYSTOLIC BLOOD PRESSURE: 123 MMHG | RESPIRATION RATE: 16 BRPM | BODY MASS INDEX: 28.39 KG/M2 | TEMPERATURE: 98.2 F | OXYGEN SATURATION: 97 % | DIASTOLIC BLOOD PRESSURE: 80 MMHG | HEIGHT: 71 IN | WEIGHT: 202.8 LBS | HEART RATE: 62 BPM

## 2025-06-04 DIAGNOSIS — J06.9 URI WITH COUGH AND CONGESTION: Primary | ICD-10-CM

## 2025-06-04 DIAGNOSIS — J98.01 ACUTE BRONCHOSPASM: ICD-10-CM

## 2025-06-04 PROCEDURE — 3074F SYST BP LT 130 MM HG: CPT | Performed by: FAMILY MEDICINE

## 2025-06-04 PROCEDURE — 99213 OFFICE O/P EST LOW 20 MIN: CPT | Mod: 25 | Performed by: FAMILY MEDICINE

## 2025-06-04 PROCEDURE — 94640 AIRWAY INHALATION TREATMENT: CPT | Performed by: FAMILY MEDICINE

## 2025-06-04 PROCEDURE — 3079F DIAST BP 80-89 MM HG: CPT | Performed by: FAMILY MEDICINE

## 2025-06-04 RX ORDER — PREDNISONE 20 MG/1
TABLET ORAL
Qty: 10 TABLET | Refills: 0 | Status: SHIPPED | OUTPATIENT
Start: 2025-06-04

## 2025-06-04 RX ORDER — ALBUTEROL SULFATE 0.83 MG/ML
2.5 SOLUTION RESPIRATORY (INHALATION) ONCE
Status: COMPLETED | OUTPATIENT
Start: 2025-06-04 | End: 2025-06-04

## 2025-06-04 RX ORDER — BENZONATATE 100 MG/1
100 CAPSULE ORAL 3 TIMES DAILY PRN
Qty: 30 CAPSULE | Refills: 0 | Status: SHIPPED | OUTPATIENT
Start: 2025-06-04

## 2025-06-04 RX ORDER — ALBUTEROL SULFATE 90 UG/1
2 INHALANT RESPIRATORY (INHALATION) EVERY 4 HOURS PRN
Qty: 18 G | Refills: 0 | Status: SHIPPED | OUTPATIENT
Start: 2025-06-04

## 2025-06-04 RX ADMIN — ALBUTEROL SULFATE 2.5 MG: 0.83 SOLUTION RESPIRATORY (INHALATION) at 11:57

## 2025-06-04 NOTE — PROGRESS NOTES
Urgent Care Clinic Visit  {Rapid Rooming (Optional):702584}  Chief Complaint   Patient presents with    Cough     Has had cough for 4 days chlls off and on           {(!) Visit Details have not yet been documented.  Please enter Visit Details and then use this list to pull in documentation. (Optional):800487}  {MA/LPN/RN Pre-Provider Visit Orders- hCG/UA/Strep/Influenza/Vaginal Infection (Optional):141136}

## 2025-06-04 NOTE — PATIENT INSTRUCTIONS
Albuterol inhaler with spacer every 4 hours as needed for cough, wheeze, chest tightness or shortness of breath.     Prednisone with food daily for 5 days.     Benzonatate perles every 8 hours if needed for cough, may increase to 2 tabs at a time if needed. If not helping then discontinue.     Fluids, steam, nasal saline for congestion.   Drink water.   Rest    Cough drops, over the counter cough and cold medications, honey in water or tea as needed.     Recheck if not improving or getting worse

## 2025-06-05 NOTE — PROGRESS NOTES
Assessment/Plan:   1. Bronchospasm  - albuterol (PROVENTIL) neb solution 2.5 mg  - albuterol (PROAIR HFA/PROVENTIL HFA/VENTOLIN HFA) 108 (90 Base) MCG/ACT inhaler; Inhale 2 puffs into the lungs every 4 hours as needed for shortness of breath, wheezing or cough.  Dispense: 18 g; Refill: 0  - predniSONE (DELTASONE) 20 MG tablet; 40mg daily for 5 days  Dispense: 10 tablet; Refill: 0  - Optichamber/Spacer Order for DME - ONLY FOR DME  2. URI with cough and congestion (Primary)  - benzonatate (TESSALON) 100 MG capsule; Take 1 capsule (100 mg) by mouth 3 times daily as needed for cough.  Dispense: 30 capsule; Refill: 0    Albuterol inhaler with spacer every 4 hours as needed for cough, wheeze, chest tightness or shortness of breath.     Prednisone with food daily for 5 days.     Benzonatate perles every 8 hours if needed for cough, may increase to 2 tabs at a time if needed. If not helping then discontinue.     Fluids, steam, nasal saline for congestion.   Drink water.   Rest    Cough drops, over the counter cough and cold medications, honey in water or tea as needed.     Recheck if not improving or getting worse     I discussed red flag symptoms, return precautions to clinic/ER and follow up care with patient/parent.  Expected clinical course, symptomatic cares advised. Questions answered. Patient/parent amenable with plan.        Subjective:     Francisco Magana is a 60 year old male who presents ***    Allergies   Allergen Reactions    Adhesive Tape Hives and Rash     Current Outpatient Medications   Medication Sig Dispense Refill    albuterol (PROAIR HFA/PROVENTIL HFA/VENTOLIN HFA) 108 (90 Base) MCG/ACT inhaler Inhale 2 puffs into the lungs every 4 hours as needed for shortness of breath, wheezing or cough. 18 g 0    benzonatate (TESSALON) 100 MG capsule Take 1 capsule (100 mg) by mouth 3 times daily as needed for cough. 30 capsule 0    predniSONE (DELTASONE) 20 MG tablet 40mg daily for 5 days 10 tablet 0    ibuprofen  "(ADVIL/MOTRIN) 200 MG tablet Take 400 mg by mouth daily       No current facility-administered medications for this visit.     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    This note has been dictated in part using voice recognition software.  Any grammatical or context distortions are unintentional and inherent to the software.  Please feel free to contact me directly for clarification if needed.    " are unintentional and inherent to the software.  Please feel free to contact me directly for clarification if needed.